# Patient Record
Sex: FEMALE | Race: ASIAN | Employment: FULL TIME | URBAN - METROPOLITAN AREA
[De-identification: names, ages, dates, MRNs, and addresses within clinical notes are randomized per-mention and may not be internally consistent; named-entity substitution may affect disease eponyms.]

---

## 2017-09-02 PROCEDURE — 87338 HPYLORI STOOL AG IA: CPT | Performed by: INTERNAL MEDICINE

## 2017-09-15 PROCEDURE — 88305 TISSUE EXAM BY PATHOLOGIST: CPT | Performed by: INTERNAL MEDICINE

## 2019-11-25 ENCOUNTER — APPOINTMENT (OUTPATIENT)
Dept: GENERAL RADIOLOGY | Facility: HOSPITAL | Age: 39
End: 2019-11-25
Attending: EMERGENCY MEDICINE
Payer: COMMERCIAL

## 2019-11-25 ENCOUNTER — HOSPITAL ENCOUNTER (EMERGENCY)
Facility: HOSPITAL | Age: 39
Discharge: HOME | End: 2019-11-25
Attending: EMERGENCY MEDICINE
Payer: COMMERCIAL

## 2019-11-25 VITALS
SYSTOLIC BLOOD PRESSURE: 108 MMHG | RESPIRATION RATE: 18 BRPM | TEMPERATURE: 98 F | HEART RATE: 68 BPM | OXYGEN SATURATION: 99 % | DIASTOLIC BLOOD PRESSURE: 76 MMHG

## 2019-11-25 DIAGNOSIS — S69.92XA INJURY TO FINGERNAIL OF LEFT HAND, INITIAL ENCOUNTER: ICD-10-CM

## 2019-11-25 DIAGNOSIS — S62.639A CLOSED FRACTURE OF TUFT OF DISTAL PHALANX OF FINGER: Primary | ICD-10-CM

## 2019-11-25 DIAGNOSIS — T14.8XXA SUPERFICIAL LACERATION: ICD-10-CM

## 2019-11-25 PROCEDURE — 26750 TREAT FINGER FRACTURE EACH: CPT

## 2019-11-25 PROCEDURE — 90471 IMMUNIZATION ADMIN: CPT

## 2019-11-25 PROCEDURE — 99283 EMERGENCY DEPT VISIT LOW MDM: CPT

## 2019-11-25 PROCEDURE — 73140 X-RAY EXAM OF FINGER(S): CPT | Performed by: EMERGENCY MEDICINE

## 2019-11-25 PROCEDURE — 99282 EMERGENCY DEPT VISIT SF MDM: CPT

## 2019-11-26 NOTE — ED NOTES
Pt stated I dont want to use the splint. Pt educcated. Pt still refuses.  Splint removed by pt request

## 2019-11-26 NOTE — ED INITIAL ASSESSMENT (HPI)
Pt states \"I slammed the car door on on my finger\" Pt able to move extremity, but pain with movement

## 2019-11-26 NOTE — ED PROVIDER NOTES
Patient Seen in: [facility] Emergency Department      History   Patient presents with:  Laceration Abrasion (integumentary)    Stated Complaint: finger laceration     HPI    42-year-old female comes to the hospital with a complaint of having a lacer She has a small superficial laceration to the lateral margin of the finger as well with no active bleeding. Range of motion is spared she has minimal decreased sensation just distal to the laceration itself as well.     ED Course   Labs Reviewed - No data

## (undated) NOTE — ED AVS SNAPSHOT
Eran Mcgregor   MRN: [id number]    Department:  [facility] Emergency Department   Date of Visit:  11/25/2019           Disclosure     Insurance plans vary and the physician(s) referred by the ER may not be covered by your plan.  Please contact your insu tell this physician (or your personal doctor if your instructions are to return to your personal doctor) about any new or lasting problems. The primary care or specialist physician will see patients referred from the [facility] Emergency Department.  Salvadore Skiff